# Patient Record
Sex: FEMALE | Race: WHITE | NOT HISPANIC OR LATINO | Employment: PART TIME | URBAN - METROPOLITAN AREA
[De-identification: names, ages, dates, MRNs, and addresses within clinical notes are randomized per-mention and may not be internally consistent; named-entity substitution may affect disease eponyms.]

---

## 2019-08-29 ENCOUNTER — OFFICE VISIT (OUTPATIENT)
Dept: FAMILY MEDICINE CLINIC | Facility: HOSPITAL | Age: 29
End: 2019-08-29
Payer: COMMERCIAL

## 2019-08-29 VITALS
BODY MASS INDEX: 23.26 KG/M2 | SYSTOLIC BLOOD PRESSURE: 108 MMHG | WEIGHT: 148.2 LBS | HEART RATE: 106 BPM | DIASTOLIC BLOOD PRESSURE: 78 MMHG | TEMPERATURE: 98.7 F | HEIGHT: 67 IN

## 2019-08-29 DIAGNOSIS — Z00.00 ANNUAL PHYSICAL EXAM: Primary | ICD-10-CM

## 2019-08-29 DIAGNOSIS — Z13.1 SCREENING FOR DIABETES MELLITUS (DM): ICD-10-CM

## 2019-08-29 DIAGNOSIS — F41.9 ANXIETY: ICD-10-CM

## 2019-08-29 DIAGNOSIS — Z13.6 SCREENING FOR CARDIOVASCULAR CONDITION: ICD-10-CM

## 2019-08-29 PROCEDURE — 99395 PREV VISIT EST AGE 18-39: CPT | Performed by: FAMILY MEDICINE

## 2019-08-29 RX ORDER — SERTRALINE HYDROCHLORIDE 100 MG/1
3 TABLET, FILM COATED ORAL DAILY
COMMUNITY
Start: 2014-07-02

## 2019-08-29 RX ORDER — LORAZEPAM 0.5 MG/1
TABLET ORAL
COMMUNITY
Start: 2014-10-30

## 2019-08-29 NOTE — ASSESSMENT & PLAN NOTE
Doing well on current regimen  For the most part it is controlled  With ongoing panic problems  Continues to see psychiatry  Working and going to school  No depression problems

## 2019-08-29 NOTE — PROGRESS NOTES
ADULT ANNUAL PHYSICAL  [facility]'s Physician Tatiana Carmona MD    NAME: Birdie Downey  AGE: 26 y o  SEX: female  : 1990     DATE: 2019     Assessment and Plan:     Problem List Items Addressed This Visit        Other    Other specified anxiety disorders     Doing well on current regimen  For the most part it is controlled  With ongoing panic problems  Continues to see psychiatry  Working and going to school  No depression problems  Relevant Medications    LORazepam (ATIVAN) 0 5 mg tablet    sertraline (ZOLOFT) 100 mg tablet      Other Visit Diagnoses     Annual physical exam    -  Primary    Screening for cardiovascular condition        Relevant Orders    CBC    Comprehensive metabolic panel    Lipid Panel with Direct LDL reflex    Screening for diabetes mellitus (DM)        Relevant Orders    Hemoglobin A1C    Magnesium    TSH, 3rd generation with Free T4 reflex        She was advised but refused cervical cancer screening  Immunizations and preventive care screenings were discussed with patient today  Appropriate education was printed on patient's after visit summary  Counseling:  Alcohol/drug use: discussed moderation in alcohol intake and avoidance of illicit drug use  · Dental Health: discussed importance of regular tooth brushing, flossing, and dental visits  No follow-ups on file  Chief Complaint:     Chief Complaint   Patient presents with    Annual Exam      History of Present Illness:     Adult Annual Physical   Patient here for a comprehensive physical exam  The patient reports no problems  Diet and Physical Activity  · Diet/Nutrition: well balanced diet  · Exercise: moderate cardiovascular exercise        Depression Screening  PHQ-9 Depression Screening    PHQ-9:    Frequency of the following problems over the past two weeks:       Little interest or pleasure in doing things:  0 - not at all  Feeling down, depressed, or hopeless:  0 - not at all  PHQ-2 Score:  0       General Health  · Sleep: sleeps well  · Hearing: normal - bilateral   · Vision: no vision problems  · Dental: regular dental visits  /GYN Health  · Last menstrual period: normal    · Contraceptive method: none  · History of STDs?: no      Review of Systems:     Review of Systems   Constitutional: Negative  Negative for activity change, appetite change, chills, diaphoresis, fatigue and fever  HENT: Negative for congestion, facial swelling and sore throat  Respiratory: Negative  Negative for apnea, cough, chest tightness and shortness of breath  Cardiovascular: Negative  Negative for chest pain and palpitations  Gastrointestinal: Negative  Negative for abdominal distention, abdominal pain, blood in stool, constipation, diarrhea and nausea  Genitourinary: Negative  Negative for difficulty urinating, dysuria, flank pain and frequency  Past Medical History:     Past Medical History:   Diagnosis Date    Depression       Past Surgical History:     History reviewed  No pertinent surgical history     Social History:     Social History     Socioeconomic History    Marital status: Single     Spouse name: None    Number of children: None    Years of education: None    Highest education level: None   Occupational History    None   Social Needs    Financial resource strain: None    Food insecurity:     Worry: None     Inability: None    Transportation needs:     Medical: None     Non-medical: None   Tobacco Use    Smoking status: Never Smoker    Smokeless tobacco: Never Used   Substance and Sexual Activity    Alcohol use: Never     Frequency: Never    Drug use: Never    Sexual activity: None   Lifestyle    Physical activity:     Days per week: None     Minutes per session: None    Stress: None   Relationships    Social connections:     Talks on phone: None     Gets together: None     Attends Religion service: None     Active member of club or organization: None     Attends meetings of clubs or organizations: None     Relationship status: None    Intimate partner violence:     Fear of current or ex partner: None     Emotionally abused: None     Physically abused: None     Forced sexual activity: None   Other Topics Concern    None   Social History Narrative    No drug use - As per [employer]    Nondrinker / no alcohol use - As per [employer]       Family History:     Family History   Problem Relation Age of Onset    Diabetes Father       Current Medications:     Current Outpatient Medications   Medication Sig Dispense Refill    LORazepam (ATIVAN) 0 5 mg tablet Take by mouth      sertraline (ZOLOFT) 100 mg tablet Take 3 tablets by mouth daily        No current facility-administered medications for this visit  Allergies:     No Known Allergies   Physical Exam:     /78   Pulse (!) 106   Temp 98 7 °F (37 1 °C)   Ht 5' 7" (1 702 m)   Wt 67 2 kg (148 lb 3 2 oz)   BMI 23 21 kg/m²     Physical Exam   Constitutional: She is oriented to person, place, and time  She appears well-developed and well-nourished  HENT:   Head: Normocephalic and atraumatic  Right Ear: External ear normal    Left Ear: External ear normal    Nose: Nose normal    Mouth/Throat: Oropharynx is clear and moist    Eyes: Pupils are equal, round, and reactive to light  Conjunctivae and EOM are normal    Neck: Normal range of motion  Neck supple  No tracheal deviation present  No thyromegaly present  Cardiovascular: Normal rate, regular rhythm and normal heart sounds  No murmur heard  Pulmonary/Chest: Effort normal and breath sounds normal  No respiratory distress  She has no wheezes  Abdominal: Soft  Bowel sounds are normal    Musculoskeletal: Normal range of motion  Neurological: She is oriented to person, place, and time  Skin: Skin is warm and dry         MD Joselin Wallace MD

## 2019-08-29 NOTE — PATIENT INSTRUCTIONS

## 2021-05-19 ENCOUNTER — VBI (OUTPATIENT)
Dept: ADMINISTRATIVE | Facility: OTHER | Age: 31
End: 2021-05-19

## 2023-05-03 ENCOUNTER — VBI (OUTPATIENT)
Dept: ADMINISTRATIVE | Facility: OTHER | Age: 33
End: 2023-05-03